# Patient Record
Sex: FEMALE | Race: WHITE | Employment: UNEMPLOYED | URBAN - METROPOLITAN AREA
[De-identification: names, ages, dates, MRNs, and addresses within clinical notes are randomized per-mention and may not be internally consistent; named-entity substitution may affect disease eponyms.]

---

## 2017-02-18 RX ORDER — ALPRAZOLAM 0.25 MG/1
0.25 TABLET ORAL EVERY 6 HOURS PRN
Qty: 30 TABLET | Refills: 1 | OUTPATIENT
Start: 2017-02-18 | End: 2017-04-06

## 2017-04-06 ENCOUNTER — OFFICE VISIT (OUTPATIENT)
Dept: INTERNAL MEDICINE CLINIC | Facility: CLINIC | Age: 57
End: 2017-04-06

## 2017-04-06 ENCOUNTER — APPOINTMENT (OUTPATIENT)
Dept: LAB | Age: 57
End: 2017-04-06
Attending: INTERNAL MEDICINE
Payer: COMMERCIAL

## 2017-04-06 VITALS
DIASTOLIC BLOOD PRESSURE: 95 MMHG | WEIGHT: 167 LBS | HEART RATE: 108 BPM | BODY MASS INDEX: 29.59 KG/M2 | SYSTOLIC BLOOD PRESSURE: 138 MMHG | HEIGHT: 63 IN

## 2017-04-06 DIAGNOSIS — N18.1 CKD (CHRONIC KIDNEY DISEASE), STAGE 1: Primary | ICD-10-CM

## 2017-04-06 DIAGNOSIS — N18.1 CKD (CHRONIC KIDNEY DISEASE), STAGE 1: ICD-10-CM

## 2017-04-06 PROCEDURE — 99212 OFFICE O/P EST SF 10 MIN: CPT | Performed by: INTERNAL MEDICINE

## 2017-04-06 PROCEDURE — 80048 BASIC METABOLIC PNL TOTAL CA: CPT

## 2017-04-06 PROCEDURE — 99213 OFFICE O/P EST LOW 20 MIN: CPT | Performed by: INTERNAL MEDICINE

## 2017-04-06 PROCEDURE — 36415 COLL VENOUS BLD VENIPUNCTURE: CPT

## 2017-04-06 RX ORDER — ALPRAZOLAM 0.5 MG/1
TABLET ORAL
COMMUNITY
Start: 2015-09-23 | End: 2017-04-06

## 2017-04-06 RX ORDER — ESTRADIOL 2 MG/1
2 TABLET ORAL DAILY
COMMUNITY
End: 2020-02-11 | Stop reason: ALTCHOICE

## 2017-04-06 RX ORDER — HYDROXYCHLOROQUINE SULFATE 200 MG/1
TABLET, FILM COATED ORAL
COMMUNITY
Start: 2015-09-23 | End: 2017-04-06

## 2017-04-06 RX ORDER — HYDROCHLOROTHIAZIDE 25 MG/1
25 TABLET ORAL DAILY
Qty: 90 TABLET | Refills: 2 | Status: SHIPPED | OUTPATIENT
Start: 2017-04-06 | End: 2017-05-02

## 2017-04-06 RX ORDER — PHENTERMINE HYDROCHLORIDE 37.5 MG/1
37.5 TABLET ORAL
Qty: 30 TABLET | Refills: 2 | Status: SHIPPED | OUTPATIENT
Start: 2017-04-06 | End: 2017-05-02

## 2017-04-06 RX ORDER — HYDROCHLOROTHIAZIDE 25 MG/1
TABLET ORAL
COMMUNITY
Start: 2016-09-26 | End: 2017-04-06

## 2017-04-06 RX ORDER — MULTIVITAMIN
TABLET ORAL
COMMUNITY
Start: 2010-04-14 | End: 2019-09-24

## 2017-04-06 RX ORDER — ALPRAZOLAM 0.25 MG/1
0.25 TABLET ORAL NIGHTLY PRN
Qty: 30 TABLET | Refills: 5 | Status: SHIPPED | OUTPATIENT
Start: 2017-04-06

## 2017-04-06 NOTE — PROGRESS NOTES
estab pt  Weight control, sle  Has a nephrologist not covered by her insurance  Blood pressure 138/95, pulse 108, height 5' 3\" (1.6 m), weight 167 lb (75.751 kg), not currently breastfeeding.   Wt Readings from Last 6 Encounters:  04/06/17 : 167 lb (75.751

## 2017-05-02 RX ORDER — PHENTERMINE HYDROCHLORIDE 37.5 MG/1
37.5 TABLET ORAL
Qty: 30 TABLET | Refills: 2 | Status: SHIPPED | OUTPATIENT
Start: 2017-05-02 | End: 2020-02-11

## 2017-05-02 RX ORDER — HYDROCHLOROTHIAZIDE 25 MG/1
25 TABLET ORAL DAILY
Qty: 90 TABLET | Refills: 2 | Status: SHIPPED | OUTPATIENT
Start: 2017-05-02

## 2017-05-02 NOTE — TELEPHONE ENCOUNTER
Patient called stating that she has to leave last minute to [place], short notice, and needs a prescription refill for the medication listed below. Please advise and sent it to the preferred pharmacy.    Phentermine HCl 37.5 MG Oral Tab Take 1 tablet (37

## 2017-05-02 NOTE — TELEPHONE ENCOUNTER
Pt following up on refill request.  Pt has an family emergency she has to go to [facility] in the morning

## 2017-05-02 NOTE — TELEPHONE ENCOUNTER
Hydrochlorothiazide receipt confirmed by pharmacy and Phentermine called in to [address] [place] [address].

## 2018-04-03 RX ORDER — ALPRAZOLAM 0.25 MG/1
TABLET ORAL
Qty: 30 TABLET | Refills: 0 | OUTPATIENT
Start: 2018-04-03

## 2018-09-11 RX ORDER — HYDROCHLOROTHIAZIDE 25 MG/1
TABLET ORAL
Qty: 30 TABLET | Refills: 1 | OUTPATIENT
Start: 2018-09-11

## 2019-10-21 PROBLEM — R10.13 EPIGASTRIC PAIN: Status: ACTIVE | Noted: 2019-10-21

## 2019-10-21 PROBLEM — R12 HEARTBURN: Status: ACTIVE | Noted: 2019-10-21

## 2019-11-26 PROBLEM — K20.90 ESOPHAGITIS DETERMINED BY BIOPSY: Status: ACTIVE | Noted: 2019-11-26

## 2019-11-26 PROBLEM — K29.00 ACUTE GASTRITIS WITHOUT HEMORRHAGE, UNSPECIFIED GASTRITIS TYPE: Status: ACTIVE | Noted: 2019-11-26

## 2020-02-10 PROBLEM — M32.9 SYSTEMIC LUPUS ERYTHEMATOSUS (HCC): Status: ACTIVE | Noted: 2020-02-10

## 2020-02-10 PROBLEM — E55.9 VITAMIN D DEFICIENCY: Status: ACTIVE | Noted: 2019-05-01

## 2020-02-10 PROBLEM — N13.30 HYDRONEPHROSIS OF RIGHT KIDNEY: Status: ACTIVE | Noted: 2018-11-19

## 2020-02-10 PROBLEM — R10.9 FLANK PAIN: Status: ACTIVE | Noted: 2020-02-10

## 2020-02-10 PROBLEM — M32.9 HISTORY OF SYSTEMIC LUPUS ERYTHEMATOSUS (SLE) (HCC): Status: ACTIVE | Noted: 2017-12-06

## 2020-02-10 PROBLEM — R79.89 HIGH SERUM PARATHYROID HORMONE (PTH): Status: ACTIVE | Noted: 2019-05-01

## 2020-02-10 PROBLEM — N13.2 URETERAL STONE WITH HYDRONEPHROSIS: Status: ACTIVE | Noted: 2018-10-29

## 2020-02-10 PROBLEM — N20.1 LEFT URETERAL STONE: Status: ACTIVE | Noted: 2019-09-06

## 2020-02-10 PROBLEM — N20.0 CALCULUS OF KIDNEY: Status: ACTIVE | Noted: 2020-02-10

## 2020-02-10 PROBLEM — F32.A DEPRESSIVE DISORDER: Status: ACTIVE | Noted: 2017-05-24

## (undated) NOTE — MR AVS SNAPSHOT
[facility] [place] [id number] [address] [id number]  [phone]               Thank you for choosing us for your health care visit with Evie Winter MD.  We are glad to serve you and happy to provide you with this summary o taking this medication, and follow the directions you see here. Commonly known as:  HYDRODIURIL           Hydroxychloroquine Sulfate 200 MG Tabs   Take 200 mg by mouth 2 (two) times daily.    What changed:  Another medication with the same name was remove Please consider the following Lifestyle Modifications. Also, please return for a follow-up Blood Pressure Check in 1 month.      Lifestyle Modification Recommendations:    Modification Recommendation   Weight Reduction Maintain normal body weight (body mas Get your heart pumping – brisk walking, biking, swimming Even 10 minute increments are effective and add up over the week   2 ½ hours per week – spread out over time Use a danielle to keep you motivated   Don’t forget strength training with weights and resist